# Patient Record
Sex: FEMALE | Race: WHITE | NOT HISPANIC OR LATINO | URBAN - METROPOLITAN AREA
[De-identification: names, ages, dates, MRNs, and addresses within clinical notes are randomized per-mention and may not be internally consistent; named-entity substitution may affect disease eponyms.]

---

## 2018-11-13 ENCOUNTER — OFFICE (OUTPATIENT)
Dept: URBAN - METROPOLITAN AREA CLINIC 19 | Facility: CLINIC | Age: 78
End: 2018-11-13

## 2018-11-13 VITALS
HEART RATE: 81 BPM | SYSTOLIC BLOOD PRESSURE: 110 MMHG | DIASTOLIC BLOOD PRESSURE: 66 MMHG | WEIGHT: 130 LBS | HEIGHT: 60 IN

## 2018-11-13 DIAGNOSIS — Z80.0 FAMILY HISTORY OF MALIGNANT NEOPLASM OF DIGESTIVE ORGANS: ICD-10-CM

## 2018-11-13 DIAGNOSIS — K64.8 OTHER HEMORRHOIDS: ICD-10-CM

## 2018-11-13 DIAGNOSIS — K92.1 MELENA: ICD-10-CM

## 2018-11-13 PROCEDURE — 99213 OFFICE O/P EST LOW 20 MIN: CPT | Performed by: INTERNAL MEDICINE

## 2019-04-24 ENCOUNTER — OFFICE (OUTPATIENT)
Dept: URBAN - METROPOLITAN AREA CLINIC 11 | Facility: CLINIC | Age: 79
End: 2019-04-24

## 2019-04-24 VITALS
HEART RATE: 82 BPM | WEIGHT: 131 LBS | HEIGHT: 60 IN | DIASTOLIC BLOOD PRESSURE: 72 MMHG | SYSTOLIC BLOOD PRESSURE: 122 MMHG

## 2019-04-24 DIAGNOSIS — Z80.0 FAMILY HISTORY OF MALIGNANT NEOPLASM OF DIGESTIVE ORGANS: ICD-10-CM

## 2019-04-24 DIAGNOSIS — Z86.010 PERSONAL HISTORY OF COLONIC POLYPS: ICD-10-CM

## 2019-04-24 DIAGNOSIS — R19.5 OTHER FECAL ABNORMALITIES: ICD-10-CM

## 2019-04-24 PROCEDURE — 99213 OFFICE O/P EST LOW 20 MIN: CPT | Performed by: INTERNAL MEDICINE

## 2019-04-24 RX ORDER — SODIUM PICOSULFATE, MAGNESIUM OXIDE, AND ANHYDROUS CITRIC ACID 10; 3.5; 12 MG/160ML; G/160ML; G/160ML
LIQUID ORAL
Qty: 1 | Refills: 0 | Status: COMPLETED
Start: 2019-04-24 | End: 2019-05-15

## 2019-05-15 ENCOUNTER — OFFICE (OUTPATIENT)
Dept: URBAN - METROPOLITAN AREA PATHOLOGY 22 | Facility: PATHOLOGY | Age: 79
End: 2019-05-15

## 2019-05-15 ENCOUNTER — AMBULATORY SURGICAL CENTER (OUTPATIENT)
Dept: URBAN - METROPOLITAN AREA SURGERY 3 | Facility: SURGERY | Age: 79
End: 2019-05-15

## 2019-05-15 VITALS
OXYGEN SATURATION: 95 % | HEART RATE: 67 BPM | SYSTOLIC BLOOD PRESSURE: 114 MMHG | TEMPERATURE: 97 F | SYSTOLIC BLOOD PRESSURE: 114 MMHG | DIASTOLIC BLOOD PRESSURE: 75 MMHG | SYSTOLIC BLOOD PRESSURE: 126 MMHG | TEMPERATURE: 97 F | DIASTOLIC BLOOD PRESSURE: 70 MMHG | WEIGHT: 130 LBS | OXYGEN SATURATION: 97 % | SYSTOLIC BLOOD PRESSURE: 110 MMHG | SYSTOLIC BLOOD PRESSURE: 112 MMHG | HEART RATE: 68 BPM | WEIGHT: 130 LBS | SYSTOLIC BLOOD PRESSURE: 142 MMHG | RESPIRATION RATE: 18 BRPM | HEIGHT: 60 IN | SYSTOLIC BLOOD PRESSURE: 112 MMHG | SYSTOLIC BLOOD PRESSURE: 110 MMHG | SYSTOLIC BLOOD PRESSURE: 126 MMHG | OXYGEN SATURATION: 95 % | DIASTOLIC BLOOD PRESSURE: 80 MMHG | RESPIRATION RATE: 16 BRPM | HEIGHT: 60 IN | RESPIRATION RATE: 20 BRPM | OXYGEN SATURATION: 97 % | HEART RATE: 72 BPM | DIASTOLIC BLOOD PRESSURE: 70 MMHG | OXYGEN SATURATION: 98 % | HEART RATE: 67 BPM | TEMPERATURE: 97.4 F | HEART RATE: 72 BPM | HEART RATE: 68 BPM | RESPIRATION RATE: 18 BRPM | HEART RATE: 70 BPM | RESPIRATION RATE: 16 BRPM | OXYGEN SATURATION: 98 % | TEMPERATURE: 97.4 F | HEART RATE: 70 BPM | RESPIRATION RATE: 14 BRPM | DIASTOLIC BLOOD PRESSURE: 59 MMHG | RESPIRATION RATE: 14 BRPM | DIASTOLIC BLOOD PRESSURE: 59 MMHG | SYSTOLIC BLOOD PRESSURE: 142 MMHG | RESPIRATION RATE: 20 BRPM | DIASTOLIC BLOOD PRESSURE: 75 MMHG | DIASTOLIC BLOOD PRESSURE: 80 MMHG

## 2019-05-15 DIAGNOSIS — K57.30 DIVERTICULOSIS OF LARGE INTESTINE WITHOUT PERFORATION OR ABS: ICD-10-CM

## 2019-05-15 DIAGNOSIS — K63.5 POLYP OF COLON: ICD-10-CM

## 2019-05-15 DIAGNOSIS — D12.0 BENIGN NEOPLASM OF CECUM: ICD-10-CM

## 2019-05-15 DIAGNOSIS — R19.5 OTHER FECAL ABNORMALITIES: ICD-10-CM

## 2019-05-15 DIAGNOSIS — D12.5 BENIGN NEOPLASM OF SIGMOID COLON: ICD-10-CM

## 2019-05-15 PROCEDURE — G8907 PT DOC NO EVENTS ON DISCHARG: HCPCS | Performed by: INTERNAL MEDICINE

## 2019-05-15 PROCEDURE — 45380 COLONOSCOPY AND BIOPSY: CPT | Mod: 59 | Performed by: INTERNAL MEDICINE

## 2019-05-15 PROCEDURE — 45385 COLONOSCOPY W/LESION REMOVAL: CPT | Performed by: INTERNAL MEDICINE

## 2019-05-15 PROCEDURE — G8918 PT W/O PREOP ORDER IV AB PRO: HCPCS | Performed by: INTERNAL MEDICINE

## 2019-05-15 PROCEDURE — 88305 TISSUE EXAM BY PATHOLOGIST: CPT | Performed by: INTERNAL MEDICINE

## 2019-05-15 NOTE — SERVICEHPINOTES
78-year-old white female followed by Dr. Cheung in the past with multiple colonoscopies in multiple polyps with advanced polyps. She has a bad family history with colon cancer in her brother and colon cancer in her daughter. She presents now with positive stool Hemoccult on routine testing. She saw blood back in November that she thought was maybe hemorrhoidal but she has not seen any since. Her bowel movements are okay intended be loose but have not changed. She does see a cardiologist but does not have coronary disease and only had some rhythm issues. She is not on blood thinners. The patient has no chest pain or shortness of breath. She has no upper GI symptoms at all in takes Nexium 40 mg daily for reflux. She has no dysphagia except for a very rare occurrence that is mild.

## 2020-12-23 ENCOUNTER — OFFICE (OUTPATIENT)
Dept: URBAN - METROPOLITAN AREA CLINIC 11 | Facility: CLINIC | Age: 80
End: 2020-12-23

## 2020-12-23 VITALS
WEIGHT: 123 LBS | SYSTOLIC BLOOD PRESSURE: 129 MMHG | OXYGEN SATURATION: 95 % | DIASTOLIC BLOOD PRESSURE: 82 MMHG | HEIGHT: 60 IN | HEART RATE: 78 BPM

## 2020-12-23 DIAGNOSIS — R13.19 OTHER DYSPHAGIA: ICD-10-CM

## 2020-12-23 PROCEDURE — 99213 OFFICE O/P EST LOW 20 MIN: CPT | Performed by: INTERNAL MEDICINE

## 2021-01-19 ENCOUNTER — AMBULATORY SURGICAL CENTER (OUTPATIENT)
Dept: URBAN - METROPOLITAN AREA SURGERY 3 | Facility: SURGERY | Age: 81
End: 2021-01-19

## 2021-01-19 ENCOUNTER — OFFICE (OUTPATIENT)
Dept: URBAN - METROPOLITAN AREA PATHOLOGY 22 | Facility: PATHOLOGY | Age: 81
End: 2021-01-19
Payer: MEDICARE

## 2021-01-19 VITALS
RESPIRATION RATE: 17 BRPM | TEMPERATURE: 97.7 F | TEMPERATURE: 96.6 F | DIASTOLIC BLOOD PRESSURE: 72 MMHG | DIASTOLIC BLOOD PRESSURE: 73 MMHG | RESPIRATION RATE: 17 BRPM | SYSTOLIC BLOOD PRESSURE: 119 MMHG | SYSTOLIC BLOOD PRESSURE: 115 MMHG | OXYGEN SATURATION: 98 % | HEART RATE: 76 BPM | OXYGEN SATURATION: 94 % | DIASTOLIC BLOOD PRESSURE: 69 MMHG | DIASTOLIC BLOOD PRESSURE: 81 MMHG | SYSTOLIC BLOOD PRESSURE: 139 MMHG | RESPIRATION RATE: 18 BRPM | OXYGEN SATURATION: 96 % | HEART RATE: 75 BPM | HEIGHT: 60 IN | HEART RATE: 73 BPM | DIASTOLIC BLOOD PRESSURE: 73 MMHG | OXYGEN SATURATION: 97 % | DIASTOLIC BLOOD PRESSURE: 81 MMHG | SYSTOLIC BLOOD PRESSURE: 126 MMHG | SYSTOLIC BLOOD PRESSURE: 126 MMHG | RESPIRATION RATE: 18 BRPM | RESPIRATION RATE: 16 BRPM | RESPIRATION RATE: 17 BRPM | SYSTOLIC BLOOD PRESSURE: 115 MMHG | SYSTOLIC BLOOD PRESSURE: 121 MMHG | RESPIRATION RATE: 16 BRPM | DIASTOLIC BLOOD PRESSURE: 69 MMHG | HEART RATE: 75 BPM | HEART RATE: 73 BPM | OXYGEN SATURATION: 96 % | OXYGEN SATURATION: 98 % | DIASTOLIC BLOOD PRESSURE: 73 MMHG | HEART RATE: 74 BPM | OXYGEN SATURATION: 97 % | SYSTOLIC BLOOD PRESSURE: 115 MMHG | SYSTOLIC BLOOD PRESSURE: 126 MMHG | HEART RATE: 76 BPM | SYSTOLIC BLOOD PRESSURE: 139 MMHG | RESPIRATION RATE: 18 BRPM | TEMPERATURE: 97.7 F | OXYGEN SATURATION: 97 % | HEIGHT: 60 IN | SYSTOLIC BLOOD PRESSURE: 139 MMHG | SYSTOLIC BLOOD PRESSURE: 119 MMHG | HEART RATE: 74 BPM | HEART RATE: 73 BPM | HEART RATE: 74 BPM | DIASTOLIC BLOOD PRESSURE: 70 MMHG | TEMPERATURE: 97.7 F | OXYGEN SATURATION: 94 % | WEIGHT: 127 LBS | TEMPERATURE: 96.6 F | HEART RATE: 76 BPM | OXYGEN SATURATION: 96 % | WEIGHT: 127 LBS | OXYGEN SATURATION: 98 % | HEART RATE: 75 BPM | SYSTOLIC BLOOD PRESSURE: 121 MMHG | DIASTOLIC BLOOD PRESSURE: 72 MMHG | DIASTOLIC BLOOD PRESSURE: 69 MMHG | DIASTOLIC BLOOD PRESSURE: 70 MMHG | SYSTOLIC BLOOD PRESSURE: 119 MMHG | HEIGHT: 60 IN | OXYGEN SATURATION: 94 % | DIASTOLIC BLOOD PRESSURE: 70 MMHG | DIASTOLIC BLOOD PRESSURE: 81 MMHG | SYSTOLIC BLOOD PRESSURE: 121 MMHG | WEIGHT: 127 LBS | TEMPERATURE: 96.6 F | DIASTOLIC BLOOD PRESSURE: 72 MMHG | RESPIRATION RATE: 16 BRPM

## 2021-01-19 DIAGNOSIS — K22.2 ESOPHAGEAL OBSTRUCTION: ICD-10-CM

## 2021-01-19 DIAGNOSIS — R13.10 DYSPHAGIA, UNSPECIFIED: ICD-10-CM

## 2021-01-19 PROCEDURE — 88305 TISSUE EXAM BY PATHOLOGIST: CPT | Performed by: INTERNAL MEDICINE

## 2021-01-19 PROCEDURE — 43248 EGD GUIDE WIRE INSERTION: CPT | Performed by: INTERNAL MEDICINE

## 2021-01-19 NOTE — SERVICEHPINOTES
80-year-old white female known to us with last colonoscopy few years ago with an adenoma. She has had multiple polyps in the past and has a strong family history with brother and daughter both with colon cancer. She presents with some dysphagia issues to solids and pills that has occurred for the last 10 months. She apparently has had endoscopy and esophageal dilation more than once in the past but I can't find those records. She thought Dr. Cheung had done that for her. The patient has stable weight and there is no sign of any bleeding or melena. She does not have any significant pain. There is no chest pain or shortness of breath. She is followed by Cardiology but just for palpitations.

## 2022-07-01 ENCOUNTER — OFFICE (OUTPATIENT)
Dept: URBAN - METROPOLITAN AREA CLINIC 11 | Facility: CLINIC | Age: 82
End: 2022-07-01

## 2022-07-01 VITALS
HEIGHT: 60 IN | DIASTOLIC BLOOD PRESSURE: 79 MMHG | WEIGHT: 137 LBS | OXYGEN SATURATION: 95 % | SYSTOLIC BLOOD PRESSURE: 141 MMHG | HEART RATE: 82 BPM

## 2022-07-01 DIAGNOSIS — K22.4 DYSKINESIA OF ESOPHAGUS: ICD-10-CM

## 2022-07-01 DIAGNOSIS — R13.19 OTHER DYSPHAGIA: ICD-10-CM

## 2022-07-01 DIAGNOSIS — Z80.0 FAMILY HISTORY OF MALIGNANT NEOPLASM OF DIGESTIVE ORGANS: ICD-10-CM

## 2022-07-01 DIAGNOSIS — Z86.010 PERSONAL HISTORY OF COLONIC POLYPS: ICD-10-CM

## 2022-07-01 PROCEDURE — 99214 OFFICE O/P EST MOD 30 MIN: CPT | Performed by: INTERNAL MEDICINE

## 2022-07-01 RX ORDER — SODIUM PICOSULFATE, MAGNESIUM OXIDE, AND ANHYDROUS CITRIC ACID 10; 3.5; 12 MG/160ML; G/160ML; G/160ML
LIQUID ORAL
Qty: 320 | Refills: 0 | Status: COMPLETED
Start: 2022-07-01 | End: 2022-08-29

## 2022-08-29 ENCOUNTER — OFFICE (OUTPATIENT)
Dept: URBAN - METROPOLITAN AREA PATHOLOGY 22 | Facility: PATHOLOGY | Age: 82
End: 2022-08-29

## 2022-08-29 ENCOUNTER — AMBULATORY SURGICAL CENTER (OUTPATIENT)
Dept: URBAN - METROPOLITAN AREA SURGERY 3 | Facility: SURGERY | Age: 82
End: 2022-08-29
Payer: MEDICARE

## 2022-08-29 VITALS
DIASTOLIC BLOOD PRESSURE: 67 MMHG | OXYGEN SATURATION: 96 % | SYSTOLIC BLOOD PRESSURE: 108 MMHG | OXYGEN SATURATION: 97 % | HEART RATE: 75 BPM | HEART RATE: 73 BPM | HEART RATE: 86 BPM | SYSTOLIC BLOOD PRESSURE: 113 MMHG | HEART RATE: 75 BPM | DIASTOLIC BLOOD PRESSURE: 67 MMHG | HEART RATE: 73 BPM | OXYGEN SATURATION: 97 % | TEMPERATURE: 98.2 F | SYSTOLIC BLOOD PRESSURE: 98 MMHG | SYSTOLIC BLOOD PRESSURE: 98 MMHG | HEART RATE: 73 BPM | WEIGHT: 135 LBS | SYSTOLIC BLOOD PRESSURE: 129 MMHG | RESPIRATION RATE: 18 BRPM | RESPIRATION RATE: 16 BRPM | OXYGEN SATURATION: 96 % | OXYGEN SATURATION: 97 % | DIASTOLIC BLOOD PRESSURE: 83 MMHG | HEART RATE: 86 BPM | HEIGHT: 60 IN | DIASTOLIC BLOOD PRESSURE: 60 MMHG | SYSTOLIC BLOOD PRESSURE: 129 MMHG | HEIGHT: 60 IN | TEMPERATURE: 98.2 F | SYSTOLIC BLOOD PRESSURE: 108 MMHG | RESPIRATION RATE: 16 BRPM | DIASTOLIC BLOOD PRESSURE: 64 MMHG | DIASTOLIC BLOOD PRESSURE: 60 MMHG | DIASTOLIC BLOOD PRESSURE: 64 MMHG | SYSTOLIC BLOOD PRESSURE: 108 MMHG | SYSTOLIC BLOOD PRESSURE: 129 MMHG | RESPIRATION RATE: 16 BRPM | DIASTOLIC BLOOD PRESSURE: 55 MMHG | WEIGHT: 135 LBS | DIASTOLIC BLOOD PRESSURE: 55 MMHG | RESPIRATION RATE: 18 BRPM | DIASTOLIC BLOOD PRESSURE: 64 MMHG | WEIGHT: 135 LBS | HEART RATE: 86 BPM | SYSTOLIC BLOOD PRESSURE: 113 MMHG | OXYGEN SATURATION: 96 % | DIASTOLIC BLOOD PRESSURE: 55 MMHG | DIASTOLIC BLOOD PRESSURE: 67 MMHG | RESPIRATION RATE: 18 BRPM | SYSTOLIC BLOOD PRESSURE: 113 MMHG | DIASTOLIC BLOOD PRESSURE: 60 MMHG | HEART RATE: 75 BPM | DIASTOLIC BLOOD PRESSURE: 83 MMHG | DIASTOLIC BLOOD PRESSURE: 83 MMHG | SYSTOLIC BLOOD PRESSURE: 98 MMHG | TEMPERATURE: 98.2 F | HEIGHT: 60 IN

## 2022-08-29 DIAGNOSIS — Z80.0 FAMILY HISTORY OF MALIGNANT NEOPLASM OF DIGESTIVE ORGANS: ICD-10-CM

## 2022-08-29 DIAGNOSIS — D12.2 BENIGN NEOPLASM OF ASCENDING COLON: ICD-10-CM

## 2022-08-29 DIAGNOSIS — Z86.010 PERSONAL HISTORY OF COLONIC POLYPS: ICD-10-CM

## 2022-08-29 DIAGNOSIS — K57.30 DIVERTICULOSIS OF LARGE INTESTINE WITHOUT PERFORATION OR ABS: ICD-10-CM

## 2022-08-29 PROBLEM — K63.5 POLYP OF COLON: Status: ACTIVE | Noted: 2022-08-29

## 2022-08-29 PROCEDURE — 45380 COLONOSCOPY AND BIOPSY: CPT | Mod: PT | Performed by: INTERNAL MEDICINE

## 2022-08-29 PROCEDURE — 88305 TISSUE EXAM BY PATHOLOGIST: CPT | Performed by: STUDENT IN AN ORGANIZED HEALTH CARE EDUCATION/TRAINING PROGRAM

## 2022-08-29 NOTE — SERVICEHPINOTES
81-year-old white female well known to us with last endoscopy 18 months ago. 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 Unfortunately dilation did not help and she has continued to have some issues with dysphagia to solids intermittently although a very rare vomiting episode. Reflux seems to be fairly well controlled otherwise. She describes normal regular bowel movements without difficulties. There is no bleeding. She is not on anticoagulants. She is followed by Dr. Sanchez without chest pain or shortness of breath. She had some palpitations in the past but did not have AFib and did not have stents or pacemaker. Her weight is stable. She actually stop smoking 7 weeks ago and is doing okay. She has had prior polyps multiple times and had advanced adenoma. She also has a very strong family history of colon cancer in multiple primary family members including her daughter.

## 2023-07-24 ENCOUNTER — OFFICE (OUTPATIENT)
Dept: URBAN - METROPOLITAN AREA CLINIC 11 | Facility: CLINIC | Age: 83
End: 2023-07-24

## 2023-07-24 VITALS
HEART RATE: 77 BPM | WEIGHT: 121 LBS | SYSTOLIC BLOOD PRESSURE: 124 MMHG | HEIGHT: 60 IN | OXYGEN SATURATION: 93 % | DIASTOLIC BLOOD PRESSURE: 65 MMHG

## 2023-07-24 DIAGNOSIS — R63.4 ABNORMAL WEIGHT LOSS: ICD-10-CM

## 2023-07-24 DIAGNOSIS — R13.19 OTHER DYSPHAGIA: ICD-10-CM

## 2023-07-24 DIAGNOSIS — R10.13 EPIGASTRIC PAIN: ICD-10-CM

## 2023-07-24 DIAGNOSIS — R68.81 EARLY SATIETY: ICD-10-CM

## 2023-07-24 PROCEDURE — 99213 OFFICE O/P EST LOW 20 MIN: CPT | Performed by: INTERNAL MEDICINE

## 2023-08-31 ENCOUNTER — AMBULATORY SURGICAL CENTER (OUTPATIENT)
Dept: URBAN - METROPOLITAN AREA SURGERY 3 | Facility: SURGERY | Age: 83
End: 2023-08-31

## 2023-08-31 VITALS
SYSTOLIC BLOOD PRESSURE: 127 MMHG | DIASTOLIC BLOOD PRESSURE: 66 MMHG | DIASTOLIC BLOOD PRESSURE: 61 MMHG | TEMPERATURE: 98 F | HEART RATE: 69 BPM | RESPIRATION RATE: 19 BRPM | RESPIRATION RATE: 20 BRPM | OXYGEN SATURATION: 97 % | OXYGEN SATURATION: 96 % | TEMPERATURE: 7.5 F | WEIGHT: 119 LBS | SYSTOLIC BLOOD PRESSURE: 104 MMHG | HEART RATE: 76 BPM | RESPIRATION RATE: 20 BRPM | HEART RATE: 73 BPM | WEIGHT: 119 LBS | SYSTOLIC BLOOD PRESSURE: 123 MMHG | OXYGEN SATURATION: 95 % | DIASTOLIC BLOOD PRESSURE: 77 MMHG | SYSTOLIC BLOOD PRESSURE: 119 MMHG | TEMPERATURE: 7.5 F | HEART RATE: 88 BPM | OXYGEN SATURATION: 96 % | DIASTOLIC BLOOD PRESSURE: 61 MMHG | HEART RATE: 76 BPM | DIASTOLIC BLOOD PRESSURE: 76 MMHG | DIASTOLIC BLOOD PRESSURE: 76 MMHG | SYSTOLIC BLOOD PRESSURE: 121 MMHG | OXYGEN SATURATION: 95 % | HEIGHT: 60 IN | SYSTOLIC BLOOD PRESSURE: 104 MMHG | RESPIRATION RATE: 16 BRPM | SYSTOLIC BLOOD PRESSURE: 127 MMHG | HEART RATE: 88 BPM | RESPIRATION RATE: 19 BRPM | HEART RATE: 69 BPM | HEART RATE: 76 BPM | TEMPERATURE: 7.5 F | RESPIRATION RATE: 22 BRPM | HEART RATE: 71 BPM | DIASTOLIC BLOOD PRESSURE: 78 MMHG | DIASTOLIC BLOOD PRESSURE: 66 MMHG | RESPIRATION RATE: 22 BRPM | TEMPERATURE: 98 F | RESPIRATION RATE: 16 BRPM | SYSTOLIC BLOOD PRESSURE: 104 MMHG | HEIGHT: 60 IN | SYSTOLIC BLOOD PRESSURE: 119 MMHG | DIASTOLIC BLOOD PRESSURE: 77 MMHG | OXYGEN SATURATION: 97 % | HEART RATE: 73 BPM | HEIGHT: 60 IN | DIASTOLIC BLOOD PRESSURE: 78 MMHG | HEART RATE: 69 BPM | SYSTOLIC BLOOD PRESSURE: 119 MMHG | DIASTOLIC BLOOD PRESSURE: 76 MMHG | OXYGEN SATURATION: 96 % | HEART RATE: 71 BPM | HEART RATE: 73 BPM | SYSTOLIC BLOOD PRESSURE: 121 MMHG | TEMPERATURE: 98 F | SYSTOLIC BLOOD PRESSURE: 121 MMHG | RESPIRATION RATE: 16 BRPM | DIASTOLIC BLOOD PRESSURE: 66 MMHG | RESPIRATION RATE: 22 BRPM | HEART RATE: 71 BPM | DIASTOLIC BLOOD PRESSURE: 78 MMHG | RESPIRATION RATE: 19 BRPM | WEIGHT: 119 LBS | OXYGEN SATURATION: 97 % | OXYGEN SATURATION: 95 % | SYSTOLIC BLOOD PRESSURE: 123 MMHG | SYSTOLIC BLOOD PRESSURE: 127 MMHG | HEART RATE: 88 BPM | DIASTOLIC BLOOD PRESSURE: 77 MMHG | RESPIRATION RATE: 20 BRPM | DIASTOLIC BLOOD PRESSURE: 61 MMHG | SYSTOLIC BLOOD PRESSURE: 123 MMHG

## 2023-08-31 DIAGNOSIS — K22.4 DYSKINESIA OF ESOPHAGUS: ICD-10-CM

## 2023-08-31 DIAGNOSIS — R13.19 OTHER DYSPHAGIA: ICD-10-CM

## 2023-08-31 PROCEDURE — 43248 EGD GUIDE WIRE INSERTION: CPT | Performed by: INTERNAL MEDICINE

## 2023-08-31 NOTE — SERVICEHPINOTES
82-year-old female very well known to me with last endoscopy 2 years ago with soft heel dilation for distal esophageal ring but she also has a motility disorder. She has chronic reflux issues but is stable and doing fairly well although she states that the she has had a recurrence of dysphagia sometimes solids and sometimes liquids. She gets choked fairly frequently. The patient does not have severe coughing paroxysms. She does also describe early satiety associated with epigastric pain usually after she tries to eat. She has had a 15-20 pound weight loss she says over the last 1 year. She does not have nausea or vomiting and does not have signs of bleeding or melena. She has no lower abdominal pain. She has been evaluated by cardiology Dr. Sanchez recently for palpitations. she has no chest pain or shortness of breath. She is not on acid suppression and she is not on anticoagulants.

## 2025-06-17 ENCOUNTER — AMBULATORY SURGICAL CENTER (OUTPATIENT)
Dept: URBAN - METROPOLITAN AREA SURGERY 2 | Facility: SURGERY | Age: 85
End: 2025-06-17
Payer: MEDICARE

## 2025-06-17 ENCOUNTER — OFFICE (OUTPATIENT)
Dept: URBAN - METROPOLITAN AREA PATHOLOGY 12 | Facility: PATHOLOGY | Age: 85
End: 2025-06-17
Payer: MEDICARE

## 2025-06-17 VITALS
HEART RATE: 71 BPM | SYSTOLIC BLOOD PRESSURE: 82 MMHG | TEMPERATURE: 97.7 F | RESPIRATION RATE: 18 BRPM | RESPIRATION RATE: 21 BRPM | DIASTOLIC BLOOD PRESSURE: 59 MMHG | DIASTOLIC BLOOD PRESSURE: 44 MMHG | HEART RATE: 72 BPM | SYSTOLIC BLOOD PRESSURE: 95 MMHG | HEART RATE: 70 BPM | RESPIRATION RATE: 18 BRPM | DIASTOLIC BLOOD PRESSURE: 44 MMHG | SYSTOLIC BLOOD PRESSURE: 94 MMHG | SYSTOLIC BLOOD PRESSURE: 132 MMHG | OXYGEN SATURATION: 98 % | RESPIRATION RATE: 16 BRPM | HEIGHT: 60 IN | HEIGHT: 60 IN | DIASTOLIC BLOOD PRESSURE: 59 MMHG | TEMPERATURE: 97.7 F | DIASTOLIC BLOOD PRESSURE: 58 MMHG | HEART RATE: 70 BPM | OXYGEN SATURATION: 97 % | SYSTOLIC BLOOD PRESSURE: 101 MMHG | SYSTOLIC BLOOD PRESSURE: 95 MMHG | OXYGEN SATURATION: 97 % | WEIGHT: 122 LBS | HEART RATE: 71 BPM | SYSTOLIC BLOOD PRESSURE: 132 MMHG | TEMPERATURE: 97.6 F | OXYGEN SATURATION: 98 % | HEART RATE: 60 BPM | TEMPERATURE: 97.6 F | SYSTOLIC BLOOD PRESSURE: 82 MMHG | DIASTOLIC BLOOD PRESSURE: 64 MMHG | RESPIRATION RATE: 16 BRPM | OXYGEN SATURATION: 99 % | HEART RATE: 60 BPM | RESPIRATION RATE: 21 BRPM | RESPIRATION RATE: 20 BRPM | WEIGHT: 122 LBS | SYSTOLIC BLOOD PRESSURE: 94 MMHG | HEART RATE: 72 BPM | DIASTOLIC BLOOD PRESSURE: 64 MMHG | SYSTOLIC BLOOD PRESSURE: 101 MMHG | OXYGEN SATURATION: 96 % | OXYGEN SATURATION: 96 % | DIASTOLIC BLOOD PRESSURE: 58 MMHG | RESPIRATION RATE: 20 BRPM | OXYGEN SATURATION: 99 %

## 2025-06-17 DIAGNOSIS — K57.30 DIVERTICULOSIS OF LARGE INTESTINE WITHOUT PERFORATION OR ABS: ICD-10-CM

## 2025-06-17 DIAGNOSIS — R13.10 DYSPHAGIA, UNSPECIFIED: ICD-10-CM

## 2025-06-17 DIAGNOSIS — K31.89 OTHER DISEASES OF STOMACH AND DUODENUM: ICD-10-CM

## 2025-06-17 DIAGNOSIS — R10.13 EPIGASTRIC PAIN: ICD-10-CM

## 2025-06-17 DIAGNOSIS — R19.7 DIARRHEA, UNSPECIFIED: ICD-10-CM

## 2025-06-17 PROCEDURE — 43450 DILATE ESOPHAGUS 1/MULT PASS: CPT | Mod: 51 | Performed by: INTERNAL MEDICINE

## 2025-06-17 PROCEDURE — 88305 TISSUE EXAM BY PATHOLOGIST: CPT | Performed by: STUDENT IN AN ORGANIZED HEALTH CARE EDUCATION/TRAINING PROGRAM

## 2025-06-17 PROCEDURE — 43239 EGD BIOPSY SINGLE/MULTIPLE: CPT | Performed by: INTERNAL MEDICINE

## 2025-06-17 PROCEDURE — 88342 IMHCHEM/IMCYTCHM 1ST ANTB: CPT | Performed by: STUDENT IN AN ORGANIZED HEALTH CARE EDUCATION/TRAINING PROGRAM

## 2025-06-17 PROCEDURE — 45331 SIGMOIDOSCOPY AND BIOPSY: CPT | Mod: 51 | Performed by: INTERNAL MEDICINE

## 2025-06-17 PROCEDURE — 88313 SPECIAL STAINS GROUP 2: CPT | Performed by: STUDENT IN AN ORGANIZED HEALTH CARE EDUCATION/TRAINING PROGRAM

## 2025-06-17 RX ADMIN — PROPOFOL 200 MG: 10 INJECTION, EMULSION INTRAVENOUS at 13:36

## 2025-06-19 LAB
GASTRO ONE PATHOLOGY: PDF REPORT: (no result)
GASTRO ONE PATHOLOGY: PDF REPORT: (no result)